# Patient Record
Sex: FEMALE | Race: WHITE | NOT HISPANIC OR LATINO | URBAN - METROPOLITAN AREA
[De-identification: names, ages, dates, MRNs, and addresses within clinical notes are randomized per-mention and may not be internally consistent; named-entity substitution may affect disease eponyms.]

---

## 2022-10-18 ENCOUNTER — OFFICE (OUTPATIENT)
Dept: URBAN - METROPOLITAN AREA CLINIC 19 | Facility: CLINIC | Age: 37
End: 2022-10-18

## 2022-10-18 VITALS
HEART RATE: 91 BPM | OXYGEN SATURATION: 98 % | HEIGHT: 60 IN | DIASTOLIC BLOOD PRESSURE: 91 MMHG | SYSTOLIC BLOOD PRESSURE: 127 MMHG | WEIGHT: 174 LBS

## 2022-10-18 DIAGNOSIS — R11.0 NAUSEA: ICD-10-CM

## 2022-10-18 DIAGNOSIS — R10.11 RIGHT UPPER QUADRANT PAIN: ICD-10-CM

## 2022-10-18 DIAGNOSIS — R19.4 CHANGE IN BOWEL HABIT: ICD-10-CM

## 2022-10-18 PROCEDURE — 99215 OFFICE O/P EST HI 40 MIN: CPT

## 2022-10-18 NOTE — SERVICENOTES
The patient's assessment was reviewed with Dr. Ferreira and a collaborative plan of care was established.

## 2022-10-18 NOTE — SERVICEHPINOTES
36-year-old  white female here with her  for follow-up after recent ED visit at [facility] for severe right upper quadrant pain.  Symptoms having progressive and intermittent over the last week.  Symptoms are intermittent and she is not able to identify any aggravating or alleviating factors.  Symptoms became severe and she presented to the ED at [place] remarkable for leukocytosis (WBC=14) and CT abdomen/pelvis was largely unremarkable.  She was discharged on pain medicine.  She presented to an urgent care clinic yesterday who reviewed records and gave her an empiric course of Cipro and Flagyl for 10 days.  Her symptoms have not changed in they continue to fluctuate and are often severe, doubling her over in pain.  She has not been able to eat or drink much due to the pain.  She does drink alcohol, but usually just a few times a week and denies any recent use of alcohol.  Around a month ago she apparently had the flu which turned into a bronchitis.  She was given doxycycline and a course of prednisone.  She developed a rash and allergic reaction to doxycycline which was discontinued.  Apparently her upper respiratory symptoms have all completely resolved at this point.  She does report some nausea, but denies any vomiting. She has not had a fever.  Her bowel movements have always been abnormal and fluctuate between constipation and diarrhea.  She denies any overt GI bleeding.  She did have a laparoscopic cholecystectomy for a dysfunction gallbladder few years ago.  She denies any other GI tests or studies.  Family history is negative for colon neoplasm.

## 2022-11-01 ENCOUNTER — OFFICE (OUTPATIENT)
Dept: URBAN - METROPOLITAN AREA CLINIC 19 | Facility: CLINIC | Age: 37
End: 2022-11-01

## 2022-11-01 VITALS
DIASTOLIC BLOOD PRESSURE: 79 MMHG | OXYGEN SATURATION: 99 % | SYSTOLIC BLOOD PRESSURE: 119 MMHG | HEART RATE: 85 BPM | WEIGHT: 172 LBS | HEIGHT: 60 IN

## 2022-11-01 DIAGNOSIS — K21.9 GASTRO-ESOPHAGEAL REFLUX DISEASE WITHOUT ESOPHAGITIS: ICD-10-CM

## 2022-11-01 DIAGNOSIS — D72.829 ELEVATED WHITE BLOOD CELL COUNT, UNSPECIFIED: ICD-10-CM

## 2022-11-01 DIAGNOSIS — R19.4 CHANGE IN BOWEL HABIT: ICD-10-CM

## 2022-11-01 DIAGNOSIS — R11.0 NAUSEA: ICD-10-CM

## 2022-11-01 DIAGNOSIS — R10.11 RIGHT UPPER QUADRANT PAIN: ICD-10-CM

## 2022-11-01 LAB
AMYLASE: 55 U/L (ref 31–110)
C-REACTIVE PROTEIN, QUANT: 29 MG/L — HIGH (ref 0–10)
CBC, PLATELET, NO DIFFERENTIAL: HEMATOCRIT: 41.3 % (ref 34–46.6)
CBC, PLATELET, NO DIFFERENTIAL: HEMOGLOBIN: 12.5 G/DL (ref 11.1–15.9)
CBC, PLATELET, NO DIFFERENTIAL: MCH: 27.2 PG (ref 26.6–33)
CBC, PLATELET, NO DIFFERENTIAL: MCHC: 30.3 G/DL — LOW (ref 31.5–35.7)
CBC, PLATELET, NO DIFFERENTIAL: MCV: 90 FL (ref 79–97)
CBC, PLATELET, NO DIFFERENTIAL: PLATELETS: 328 X10E3/UL (ref 150–450)
CBC, PLATELET, NO DIFFERENTIAL: RBC: 4.6 X10E6/UL (ref 3.77–5.28)
CBC, PLATELET, NO DIFFERENTIAL: RDW: 12.8 % (ref 11.7–15.4)
CBC, PLATELET, NO DIFFERENTIAL: WBC: 10 X10E3/UL (ref 3.4–10.8)
CELIAC DISEASE COMPREHENSIVE: DEAMIDATED GLIADIN ABS, IGA: 5 UNITS (ref 0–19)
CELIAC DISEASE COMPREHENSIVE: DEAMIDATED GLIADIN ABS, IGG: 2 UNITS (ref 0–19)
CELIAC DISEASE COMPREHENSIVE: ENDOMYSIAL ANTIBODY IGA: NEGATIVE
CELIAC DISEASE COMPREHENSIVE: IMMUNOGLOBULIN A, QN, SERUM: 225 MG/DL (ref 87–352)
CELIAC DISEASE COMPREHENSIVE: T-TRANSGLUTAMINASE (TTG) IGA: <2 U/ML
CELIAC DISEASE COMPREHENSIVE: T-TRANSGLUTAMINASE (TTG) IGG: <2 U/ML
LIPASE: 31 U/L (ref 14–72)

## 2022-11-01 PROCEDURE — 99214 OFFICE O/P EST MOD 30 MIN: CPT

## 2022-11-01 NOTE — SERVICEHPINOTES
36-year-old  white female returns with her  for follow-up of her RUQ pain. 
ruba yeh I initially saw her 10/18/22 for follow-up after a recent ED visit at [facility] for severe right upper quadrant pain.  Symptoms had been progressive and intermittent over the previous week.  She was not able to identify any aggravating or alleviating factors.  Symptoms became severe and she went to the ED at [place] where tests were remarkable for leukocytosis (WBC=14) and a CT abdomen/pelvis that was largely unremarkable.  She was discharged on pain medicine.  She presented to an urgent care clinic 10/17/22 who reviewed records and gave her an empiric course of Cipro and Flagyl for 10 days.  Her symptoms had not changed and continued to fluctuate and would be severe at times, doubling her over in pain.  She has not been able to eat or drink much due to the pain.  She does drink alcohol, but usually just a few times a week and denies any recent use of alcohol.  Around a month ago she apparently had the flu which turned into a bronchitis.  She was given doxycycline and a course of prednisone.  She developed a rash and allergic reaction to doxycycline which was discontinued.  Her upper respiratory symptoms all completely resolved.  She also complained of nausea, but denied any vomiting. 
ruba yeh I was concerned about her symptoms at the time as she was tearful about her pain and advised her to go to the hospital at [facility].  I was concerned about possible biliary obstruction and felt that repeat labs, especially LFTs, and potentially an MRCP was warranted.  She did present to [facility] where lab work was largely unremarkable except for mild leukocytosis and she had an AUS that was unremarkable and showed no biliary obstruction.  She was discharged and encouraged to follow-up here.
ruba yeh   Since being discharged from [facility] and being seen by me 10/18/22, her symptoms have improved.  She completed a 10 day course of Cipro and Flagyl.  She did have diarrhea while she was taking the antibiotics.  This is since resolved and she has some mild constipation now.  Her pain has significantly improved, but it does come and go.  She has been using a heating pad and ibuprofen as needed which does seem to help with her symptoms.  She does continue to have quite a bit of nausea.  She was playing tennis over the weekend and reportedly had a significant amount of nausea while doing this.  She does have chronic reflux and is dependent on omeprazole 40 mg daily.  She denies dysphagia or overt GI bleeding.br
br She did have a laparoscopic cholecystectomy for a dysfunction gallbladder few years ago.  She denies any other GI tests or studies.  Family history is negative for colon neoplasm.

## 2022-11-02 ENCOUNTER — AMBULATORY SURGICAL CENTER (OUTPATIENT)
Dept: URBAN - METROPOLITAN AREA SURGERY 2 | Facility: SURGERY | Age: 37
End: 2022-11-02

## 2022-11-02 ENCOUNTER — OFFICE (OUTPATIENT)
Dept: URBAN - METROPOLITAN AREA PATHOLOGY 20 | Facility: PATHOLOGY | Age: 37
End: 2022-11-02

## 2022-11-02 VITALS
DIASTOLIC BLOOD PRESSURE: 60 MMHG | HEART RATE: 74 BPM | WEIGHT: 173 LBS | DIASTOLIC BLOOD PRESSURE: 55 MMHG | HEART RATE: 74 BPM | HEART RATE: 79 BPM | TEMPERATURE: 97.8 F | DIASTOLIC BLOOD PRESSURE: 60 MMHG | SYSTOLIC BLOOD PRESSURE: 130 MMHG | SYSTOLIC BLOOD PRESSURE: 107 MMHG | SYSTOLIC BLOOD PRESSURE: 107 MMHG | DIASTOLIC BLOOD PRESSURE: 55 MMHG | DIASTOLIC BLOOD PRESSURE: 55 MMHG | OXYGEN SATURATION: 97 % | RESPIRATION RATE: 20 BRPM | SYSTOLIC BLOOD PRESSURE: 130 MMHG | OXYGEN SATURATION: 97 % | WEIGHT: 173 LBS | SYSTOLIC BLOOD PRESSURE: 98 MMHG | HEART RATE: 77 BPM | SYSTOLIC BLOOD PRESSURE: 107 MMHG | HEART RATE: 83 BPM | TEMPERATURE: 97.8 F | HEART RATE: 74 BPM | DIASTOLIC BLOOD PRESSURE: 59 MMHG | OXYGEN SATURATION: 94 % | DIASTOLIC BLOOD PRESSURE: 60 MMHG | SYSTOLIC BLOOD PRESSURE: 98 MMHG | RESPIRATION RATE: 20 BRPM | DIASTOLIC BLOOD PRESSURE: 59 MMHG | TEMPERATURE: 97.8 F | TEMPERATURE: 98.2 F | WEIGHT: 173 LBS | HEIGHT: 60 IN | HEART RATE: 92 BPM | SYSTOLIC BLOOD PRESSURE: 105 MMHG | HEART RATE: 77 BPM | RESPIRATION RATE: 16 BRPM | SYSTOLIC BLOOD PRESSURE: 105 MMHG | DIASTOLIC BLOOD PRESSURE: 61 MMHG | HEART RATE: 79 BPM | HEART RATE: 83 BPM | DIASTOLIC BLOOD PRESSURE: 61 MMHG | DIASTOLIC BLOOD PRESSURE: 61 MMHG | HEIGHT: 60 IN | OXYGEN SATURATION: 94 % | TEMPERATURE: 98.2 F | DIASTOLIC BLOOD PRESSURE: 81 MMHG | SYSTOLIC BLOOD PRESSURE: 105 MMHG | DIASTOLIC BLOOD PRESSURE: 59 MMHG | RESPIRATION RATE: 20 BRPM | OXYGEN SATURATION: 94 % | DIASTOLIC BLOOD PRESSURE: 81 MMHG | RESPIRATION RATE: 16 BRPM | HEART RATE: 83 BPM | DIASTOLIC BLOOD PRESSURE: 81 MMHG | HEART RATE: 79 BPM | RESPIRATION RATE: 16 BRPM | HEIGHT: 60 IN | OXYGEN SATURATION: 97 % | HEART RATE: 77 BPM | TEMPERATURE: 98.2 F | HEART RATE: 92 BPM | HEART RATE: 92 BPM | SYSTOLIC BLOOD PRESSURE: 98 MMHG | SYSTOLIC BLOOD PRESSURE: 130 MMHG

## 2022-11-02 DIAGNOSIS — K29.70 GASTRITIS, UNSPECIFIED, WITHOUT BLEEDING: ICD-10-CM

## 2022-11-02 PROBLEM — K21.9 CHRONIC GERD: Status: ACTIVE | Noted: 2022-11-02

## 2022-11-02 PROBLEM — K31.89 OTHER DISEASES OF STOMACH AND DUODENUM: Status: ACTIVE | Noted: 2022-11-02

## 2022-11-02 PROCEDURE — 43239 EGD BIOPSY SINGLE/MULTIPLE: CPT | Performed by: INTERNAL MEDICINE

## 2022-11-03 LAB
AMYLASE: 55 U/L (ref 31–110)
C-REACTIVE PROTEIN, QUANT: 29 MG/L — HIGH (ref 0–10)
CBC, PLATELET, NO DIFFERENTIAL: HEMATOCRIT: 41.3 % (ref 34–46.6)
CBC, PLATELET, NO DIFFERENTIAL: HEMOGLOBIN: 12.5 G/DL (ref 11.1–15.9)
CBC, PLATELET, NO DIFFERENTIAL: MCH: 27.2 PG (ref 26.6–33)
CBC, PLATELET, NO DIFFERENTIAL: MCHC: 30.3 G/DL — LOW (ref 31.5–35.7)
CBC, PLATELET, NO DIFFERENTIAL: MCV: 90 FL (ref 79–97)
CBC, PLATELET, NO DIFFERENTIAL: NRBC: (no result)
CBC, PLATELET, NO DIFFERENTIAL: PLATELETS: 328 X10E3/UL (ref 150–450)
CBC, PLATELET, NO DIFFERENTIAL: RBC: 4.6 X10E6/UL (ref 3.77–5.28)
CBC, PLATELET, NO DIFFERENTIAL: RDW: 12.8 % (ref 11.7–15.4)
CBC, PLATELET, NO DIFFERENTIAL: WBC: 10 X10E3/UL (ref 3.4–10.8)
CELIAC DISEASE COMPREHENSIVE: DEAMIDATED GLIADIN ABS, IGA: 5 UNITS (ref 0–19)
CELIAC DISEASE COMPREHENSIVE: DEAMIDATED GLIADIN ABS, IGG: 2 UNITS (ref 0–19)
CELIAC DISEASE COMPREHENSIVE: ENDOMYSIAL ANTIBODY IGA: NEGATIVE
CELIAC DISEASE COMPREHENSIVE: IMMUNOGLOBULIN A, QN, SERUM: 225 MG/DL (ref 87–352)
CELIAC DISEASE COMPREHENSIVE: T-TRANSGLUTAMINASE (TTG) IGA: <2 U/ML
CELIAC DISEASE COMPREHENSIVE: T-TRANSGLUTAMINASE (TTG) IGG: <2 U/ML
LIPASE: 31 U/L (ref 14–72)

## 2022-12-12 ENCOUNTER — OFFICE (OUTPATIENT)
Dept: URBAN - METROPOLITAN AREA CLINIC 19 | Facility: CLINIC | Age: 37
End: 2022-12-12

## 2022-12-12 VITALS
OXYGEN SATURATION: 100 % | HEIGHT: 60 IN | WEIGHT: 182 LBS | DIASTOLIC BLOOD PRESSURE: 77 MMHG | HEART RATE: 81 BPM | SYSTOLIC BLOOD PRESSURE: 115 MMHG

## 2022-12-12 DIAGNOSIS — K21.9 GASTRO-ESOPHAGEAL REFLUX DISEASE WITHOUT ESOPHAGITIS: ICD-10-CM

## 2022-12-12 DIAGNOSIS — R10.11 RIGHT UPPER QUADRANT PAIN: ICD-10-CM

## 2022-12-12 DIAGNOSIS — K58.9 IRRITABLE BOWEL SYNDROME WITHOUT DIARRHEA: ICD-10-CM

## 2022-12-12 PROCEDURE — 99214 OFFICE O/P EST MOD 30 MIN: CPT

## 2022-12-12 RX ORDER — AMITRIPTYLINE HYDROCHLORIDE 25 MG/1
25 TABLET, FILM COATED ORAL
Qty: 30 | Refills: 11 | Status: ACTIVE
Start: 2022-11-17

## 2022-12-12 NOTE — SERVICEHPINOTES
36-year-old  white female returns for routine follow-up of her RUQ pain and IBS.I initially saw her 10/18/22 for follow-up after a recent ED visit at [facility] for severe right upper quadrant pain.  Symptoms had been progressive and intermittent over the previous week.  She was not able to identify any aggravating or alleviating factors.  Symptoms became severe and she went to the ED at [facility] where tests were remarkable for leukocytosis (WBC=14). CT abdomen/pelvis 10/14/22 was normal.  She was discharged on pain medicine. She then went to an urgent care clinic 10/17/22 who reviewed records and gave her an empiric course of Cipro and Flagyl for 10 days.  Her symptoms had not changed and continued to fluctuate and would be severe at times, doubling her over in pain.  She has not been able to eat or drink much due to the pain.  She does drink alcohol, but usually just a few times a week and denies any recent use of alcohol.  A month ago she apparently had the flu which turned into a bronchitis.  She was given doxycycline and a course of prednisone.  She developed a rash and allergic reaction to doxycycline which was discontinued.  Her upper respiratory symptoms all completely resolved.  She also complained of nausea, but denied any vomiting. Routine lab 11/2/22 (CBC, celiac profile, amylase and lipase) was unremarkable.I was concerned about her symptoms 10/18/22 as she was tearful about her pain and advised her to go to the hospital at [facility].  I was concerned about possible biliary obstruction and felt that repeat labs, especially LFTs, and potentially an MRCP was warranted.  She did present to [facility] where lab work was largely unremarkable except for mild leukocytosis. AUS 10/14/22  was unremarkable.br
br   I saw her in follow-up 11/1/22 where routine blood work was unremarkable.  Her symptoms had improved mildly with heating pad, ibuprofen and a course of steroids.  However, they did seem to intermittently return.  She was having heartburn and reflux as well an EGD 11/2/22 found mild gastritis.  Biopsies were negative for H pylori.  Because her symptoms of abdominal pain continued to persist I gave her an empiric course of amitriptyline 10 mg to take at bedtime.  Since starting the amitriptyline her abdominal pain has improved and largely resolved.  She continues to take omeprazole 40 mg/d for her reflux.  This seems to manage her symptoms pretty well, but she occasionally has breakthrough symptoms due to dietary indiscretion.  She denies any changes in her bowel habits or overt GI bleeding. She had laparoscopic cholecystectomy for a dysfunction gallbladder few years ago.     Family history is negative for colon neoplasm.